# Patient Record
Sex: FEMALE | URBAN - METROPOLITAN AREA
[De-identification: names, ages, dates, MRNs, and addresses within clinical notes are randomized per-mention and may not be internally consistent; named-entity substitution may affect disease eponyms.]

---

## 2017-07-12 ENCOUNTER — FOLLOW UP ESTABLISHED (OUTPATIENT)
Dept: URBAN - METROPOLITAN AREA CLINIC 10 | Facility: CLINIC | Age: 67
End: 2017-07-12
Payer: COMMERCIAL

## 2017-07-12 DIAGNOSIS — H33.319 HORSESHOE TEAR OF RETINA WITHOUT DETACHMENT, UNSPECIFIED EYE: ICD-10-CM

## 2017-07-12 PROCEDURE — 92014 COMPRE OPH EXAM EST PT 1/>: CPT | Performed by: OPTOMETRIST

## 2017-07-12 PROCEDURE — 92134 CPTRZ OPH DX IMG PST SGM RTA: CPT | Performed by: OPTOMETRIST

## 2017-07-12 ASSESSMENT — KERATOMETRY
OD: 44.13
OS: 44.63

## 2017-07-12 ASSESSMENT — INTRAOCULAR PRESSURE
OS: 19
OD: 19

## 2017-07-12 ASSESSMENT — VISUAL ACUITY
OS: 20/25
OD: 20/25

## 2018-07-26 ENCOUNTER — FOLLOW UP ESTABLISHED (OUTPATIENT)
Dept: URBAN - METROPOLITAN AREA CLINIC 10 | Facility: CLINIC | Age: 68
End: 2018-07-26
Payer: MEDICARE

## 2018-07-26 DIAGNOSIS — H35.363 DRUSEN (DEGENERATIVE) OF MACULA, BILATERAL: ICD-10-CM

## 2018-07-26 PROCEDURE — 92134 CPTRZ OPH DX IMG PST SGM RTA: CPT | Performed by: OPTOMETRIST

## 2018-07-26 PROCEDURE — 92014 COMPRE OPH EXAM EST PT 1/>: CPT | Performed by: OPTOMETRIST

## 2018-07-26 ASSESSMENT — KERATOMETRY
OS: 44.38
OD: 44.25

## 2018-07-26 ASSESSMENT — INTRAOCULAR PRESSURE
OS: 20
OD: 20

## 2018-07-26 ASSESSMENT — VISUAL ACUITY
OS: 20/30
OD: 20/25

## 2020-05-01 ENCOUNTER — FOLLOW UP ESTABLISHED (OUTPATIENT)
Dept: URBAN - METROPOLITAN AREA CLINIC 10 | Facility: CLINIC | Age: 70
End: 2020-05-01
Payer: MEDICARE

## 2020-05-01 PROCEDURE — 92134 CPTRZ OPH DX IMG PST SGM RTA: CPT | Performed by: OPTOMETRIST

## 2020-05-01 PROCEDURE — 92014 COMPRE OPH EXAM EST PT 1/>: CPT | Performed by: OPTOMETRIST

## 2020-05-01 ASSESSMENT — INTRAOCULAR PRESSURE
OS: 21
OD: 20
OS: 20
OD: 21

## 2020-05-01 ASSESSMENT — VISUAL ACUITY
OD: 20/20
OS: 20/20

## 2021-07-13 ENCOUNTER — OFFICE VISIT (OUTPATIENT)
Dept: URBAN - METROPOLITAN AREA CLINIC 10 | Facility: CLINIC | Age: 71
End: 2021-07-13
Payer: MEDICARE

## 2021-07-13 DIAGNOSIS — H04.123 DRY EYE SYNDROME OF BILATERAL LACRIMAL GLANDS: ICD-10-CM

## 2021-07-13 DIAGNOSIS — H35.372 PUCKERING OF MACULA, LEFT EYE: ICD-10-CM

## 2021-07-13 PROCEDURE — 99214 OFFICE O/P EST MOD 30 MIN: CPT | Performed by: OPTOMETRIST

## 2021-07-13 PROCEDURE — 92134 CPTRZ OPH DX IMG PST SGM RTA: CPT | Performed by: OPTOMETRIST

## 2021-07-13 RX ORDER — FLUOROMETHOLONE 1 MG/ML
0.1 % SUSPENSION/ DROPS OPHTHALMIC
Qty: 2.5 | Refills: 1 | Status: INACTIVE
Start: 2021-07-13 | End: 2021-07-22

## 2021-07-13 ASSESSMENT — INTRAOCULAR PRESSURE
OS: 18
OD: 20

## 2021-07-13 ASSESSMENT — VISUAL ACUITY
OD: 20/20
OS: 20/20

## 2021-07-13 NOTE — IMPRESSION/PLAN
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 LASER '14. Stable. Plan: S/p laser. Stable. RD precautions reviewed.

## 2021-07-13 NOTE — IMPRESSION/PLAN
Impression: Drusen (degenerative) of macula, bilateral Plan: Mild fine drusen. Mac. OCT reviewed. Monitor.

## 2021-07-13 NOTE — IMPRESSION/PLAN
Impression: Puckering of macula, left eye Plan: Mild perimacular  ERM c no CME. Monitor. RTC STAT for vision changes.

## 2021-07-13 NOTE — IMPRESSION/PLAN
Impression: Tear film insufficiency of bilateral lacrimal glands Plan: Epiphora OS>OD. FELISA/ MGD. Discussed lid hygiene, warm compresses, lipid based tears, and Omega 3 fish oils. Free flow on D&I OU. Start FML OU tid x 1 wk then bid OU. 
RTC 1 month for f/u

## 2021-08-24 ENCOUNTER — OFFICE VISIT (OUTPATIENT)
Dept: URBAN - METROPOLITAN AREA CLINIC 10 | Facility: CLINIC | Age: 71
End: 2021-08-24
Payer: MEDICARE

## 2021-08-24 DIAGNOSIS — H52.223 REGULAR ASTIGMATISM, BILATERAL: ICD-10-CM

## 2021-08-24 DIAGNOSIS — H25.813 COMBINED FORMS OF AGE-RELATED CATARACT, BILATERAL: ICD-10-CM

## 2021-08-24 PROCEDURE — 99213 OFFICE O/P EST LOW 20 MIN: CPT | Performed by: OPTOMETRIST

## 2021-08-24 ASSESSMENT — VISUAL ACUITY
OD: 20/20
OS: 20/20

## 2021-08-24 NOTE — IMPRESSION/PLAN
Impression: Tear film insufficiency of bilateral lacrimal glands Plan: FELISA/ MGD. Symptoms of epiphora improved. Cont. warm compresses and artificial tears. Pt. now taking OTC allergy med po and reports improvement. Finished FML.

## 2022-08-24 ENCOUNTER — OFFICE VISIT (OUTPATIENT)
Dept: URBAN - METROPOLITAN AREA CLINIC 10 | Facility: CLINIC | Age: 72
End: 2022-08-24
Payer: MEDICARE

## 2022-08-24 DIAGNOSIS — H35.363 DRUSEN (DEGENERATIVE) OF MACULA, BILATERAL: ICD-10-CM

## 2022-08-24 DIAGNOSIS — H04.123 DRY EYE SYNDROME OF BILATERAL LACRIMAL GLANDS: ICD-10-CM

## 2022-08-24 DIAGNOSIS — H35.372 PUCKERING OF MACULA, LEFT EYE: ICD-10-CM

## 2022-08-24 DIAGNOSIS — H25.813 COMBINED FORMS OF AGE-RELATED CATARACT, BILATERAL: Primary | ICD-10-CM

## 2022-08-24 DIAGNOSIS — H52.223 REGULAR ASTIGMATISM, BILATERAL: ICD-10-CM

## 2022-08-24 DIAGNOSIS — H33.319 HORSESHOE TEAR OF RETINA WITHOUT DETACHMENT, UNSPECIFIED EYE: ICD-10-CM

## 2022-08-24 PROCEDURE — 99214 OFFICE O/P EST MOD 30 MIN: CPT | Performed by: OPTOMETRIST

## 2022-08-24 PROCEDURE — 92134 CPTRZ OPH DX IMG PST SGM RTA: CPT | Performed by: OPTOMETRIST

## 2022-08-24 ASSESSMENT — KERATOMETRY
OD: 44.25
OS: 44.63

## 2022-08-24 ASSESSMENT — INTRAOCULAR PRESSURE
OS: 13
OD: 14

## 2022-08-24 ASSESSMENT — VISUAL ACUITY
OD: 20/20
OS: 20/20

## 2023-09-27 ENCOUNTER — OFFICE VISIT (OUTPATIENT)
Dept: URBAN - METROPOLITAN AREA CLINIC 10 | Facility: CLINIC | Age: 73
End: 2023-09-27
Payer: MEDICARE

## 2023-09-27 DIAGNOSIS — H35.363 DRUSEN (DEGENERATIVE) OF MACULA, BILATERAL: ICD-10-CM

## 2023-09-27 DIAGNOSIS — H04.123 DRY EYE SYNDROME OF BILATERAL LACRIMAL GLANDS: ICD-10-CM

## 2023-09-27 DIAGNOSIS — H35.372 PUCKERING OF MACULA, LEFT EYE: ICD-10-CM

## 2023-09-27 DIAGNOSIS — H25.813 COMBINED FORMS OF AGE-RELATED CATARACT, BILATERAL: Primary | ICD-10-CM

## 2023-09-27 DIAGNOSIS — H33.319 HORSESHOE TEAR OF RETINA WITHOUT DETACHMENT, UNSPECIFIED EYE: ICD-10-CM

## 2023-09-27 PROCEDURE — 92134 CPTRZ OPH DX IMG PST SGM RTA: CPT | Performed by: OPTOMETRIST

## 2023-09-27 PROCEDURE — 99214 OFFICE O/P EST MOD 30 MIN: CPT | Performed by: OPTOMETRIST

## 2023-09-27 ASSESSMENT — VISUAL ACUITY
OD: 20/20
OS: 20/25

## 2023-09-27 ASSESSMENT — KERATOMETRY
OD: 44.38
OS: 44.75

## 2023-09-27 ASSESSMENT — INTRAOCULAR PRESSURE
OD: 19
OS: 19

## 2025-08-26 ENCOUNTER — OFFICE VISIT (OUTPATIENT)
Dept: URBAN - METROPOLITAN AREA CLINIC 10 | Facility: CLINIC | Age: 75
End: 2025-08-26
Payer: MEDICARE

## 2025-08-26 DIAGNOSIS — H35.372 PUCKERING OF MACULA, LEFT EYE: ICD-10-CM

## 2025-08-26 DIAGNOSIS — H25.813 COMBINED FORMS OF AGE-RELATED CATARACT, BILATERAL: Primary | ICD-10-CM

## 2025-08-26 DIAGNOSIS — H33.319 HORSESHOE TEAR OF RETINA WITHOUT DETACHMENT, UNSPECIFIED EYE: ICD-10-CM

## 2025-08-26 DIAGNOSIS — H04.123 DRY EYE SYNDROME OF BILATERAL LACRIMAL GLANDS: ICD-10-CM

## 2025-08-26 PROCEDURE — 99214 OFFICE O/P EST MOD 30 MIN: CPT | Performed by: OPTOMETRIST

## 2025-08-26 PROCEDURE — 92134 CPTRZ OPH DX IMG PST SGM RTA: CPT | Performed by: OPTOMETRIST

## 2025-08-26 ASSESSMENT — INTRAOCULAR PRESSURE
OS: 16
OD: 16

## 2025-08-26 ASSESSMENT — VISUAL ACUITY
OD: 20/20
OS: 20/25